# Patient Record
Sex: FEMALE | Race: WHITE | NOT HISPANIC OR LATINO | URBAN - METROPOLITAN AREA
[De-identification: names, ages, dates, MRNs, and addresses within clinical notes are randomized per-mention and may not be internally consistent; named-entity substitution may affect disease eponyms.]

---

## 2021-03-02 ENCOUNTER — OUTPATIENT (OUTPATIENT)
Dept: OUTPATIENT SERVICES | Facility: HOSPITAL | Age: 36
LOS: 1 days | End: 2021-03-02
Payer: COMMERCIAL

## 2021-03-02 VITALS
WEIGHT: 145.06 LBS | OXYGEN SATURATION: 100 % | TEMPERATURE: 99 F | HEIGHT: 63 IN | HEART RATE: 58 BPM | SYSTOLIC BLOOD PRESSURE: 120 MMHG | RESPIRATION RATE: 14 BRPM | DIASTOLIC BLOOD PRESSURE: 81 MMHG

## 2021-03-02 DIAGNOSIS — Z01.818 ENCOUNTER FOR OTHER PREPROCEDURAL EXAMINATION: ICD-10-CM

## 2021-03-02 DIAGNOSIS — K06.1 GINGIVAL ENLARGEMENT: ICD-10-CM

## 2021-03-02 DIAGNOSIS — Z98.890 OTHER SPECIFIED POSTPROCEDURAL STATES: Chronic | ICD-10-CM

## 2021-03-02 DIAGNOSIS — K60.1 CHRONIC ANAL FISSURE: ICD-10-CM

## 2021-03-02 DIAGNOSIS — Z98.891 HISTORY OF UTERINE SCAR FROM PREVIOUS SURGERY: Chronic | ICD-10-CM

## 2021-03-02 LAB
HCT VFR BLD CALC: 39.8 % — SIGNIFICANT CHANGE UP (ref 34.5–45)
HGB BLD-MCNC: 13 G/DL — SIGNIFICANT CHANGE UP (ref 11.5–15.5)
MCHC RBC-ENTMCNC: 29.7 PG — SIGNIFICANT CHANGE UP (ref 27–34)
MCHC RBC-ENTMCNC: 32.7 GM/DL — SIGNIFICANT CHANGE UP (ref 32–36)
MCV RBC AUTO: 90.9 FL — SIGNIFICANT CHANGE UP (ref 80–100)
NRBC # BLD: 0 /100 WBCS — SIGNIFICANT CHANGE UP (ref 0–0)
PLATELET # BLD AUTO: 273 K/UL — SIGNIFICANT CHANGE UP (ref 150–400)
RBC # BLD: 4.38 M/UL — SIGNIFICANT CHANGE UP (ref 3.8–5.2)
RBC # FLD: 13.2 % — SIGNIFICANT CHANGE UP (ref 10.3–14.5)
WBC # BLD: 9.84 K/UL — SIGNIFICANT CHANGE UP (ref 3.8–10.5)
WBC # FLD AUTO: 9.84 K/UL — SIGNIFICANT CHANGE UP (ref 3.8–10.5)

## 2021-03-02 PROCEDURE — 85027 COMPLETE CBC AUTOMATED: CPT

## 2021-03-02 PROCEDURE — G0463: CPT

## 2021-03-02 RX ORDER — LIDOCAINE HCL 20 MG/ML
0.2 VIAL (ML) INJECTION ONCE
Refills: 0 | Status: DISCONTINUED | OUTPATIENT
Start: 2021-03-16 | End: 2021-03-30

## 2021-03-02 RX ORDER — SODIUM CHLORIDE 9 MG/ML
3 INJECTION INTRAMUSCULAR; INTRAVENOUS; SUBCUTANEOUS EVERY 8 HOURS
Refills: 0 | Status: DISCONTINUED | OUTPATIENT
Start: 2021-03-16 | End: 2021-03-30

## 2021-03-02 NOTE — H&P PST ADULT - NSANTHOSAYNRD_GEN_A_CORE
No. VLADISLAV screening performed.  STOP BANG Legend: 0-2 = LOW Risk; 3-4 = INTERMEDIATE Risk; 5-8 = HIGH Risk

## 2021-03-02 NOTE — H&P PST ADULT - ALLERGY TYPES
goes for allergy shots/outdoor environmental allergies/indoor environmental allergies/reactions to medicines

## 2021-03-02 NOTE — H&P PST ADULT - NSANTHGENDERRD_ENT_A_CORE
[DM Type 2] : DM Type 2 [Follow - Up] : a follow-up visit [Hypothyroidism] : hypothyroidism [Weight Management/Obesity] : weight management/obesity No

## 2021-03-02 NOTE — H&P PST ADULT - ATTENDING COMMENTS
No changes with HPI or PE since the note was created. Fissurectomy will not be performed. Will instead proceed with chemical sphincterotomy with botox injection. Ample time was given for questions. Patient verbalizes understanding and agrees to plan.

## 2021-03-02 NOTE — H&P PST ADULT - HISTORY OF PRESENT ILLNESS
Mrs. Lackey is a 35 year old woman with chronic anal fissure scheduled for fissurectomy 3/16/21. Has c/o constipation on Miralax    Denies s/s of COVID no fever, chills, body aches, headaches, loss of taste/smell    COVID testing 3/13/21 at 930am

## 2021-03-02 NOTE — H&P PST ADULT - NSICDXPROBLEM_GEN_ALL_CORE_FT
PROBLEM DIAGNOSES  Problem: Chronic anal fissure  Assessment and Plan: Scheduled for Fissurectomy on 3/16/21.  Preop instructions given  CBC pending.  POCT urine pregnancy upon admission to asu

## 2021-03-08 PROBLEM — Z87.19 PERSONAL HISTORY OF OTHER DISEASES OF THE DIGESTIVE SYSTEM: Chronic | Status: ACTIVE | Noted: 2021-03-02

## 2021-03-13 ENCOUNTER — OUTPATIENT (OUTPATIENT)
Dept: OUTPATIENT SERVICES | Facility: HOSPITAL | Age: 36
LOS: 1 days | End: 2021-03-13
Payer: COMMERCIAL

## 2021-03-13 DIAGNOSIS — Z11.52 ENCOUNTER FOR SCREENING FOR COVID-19: ICD-10-CM

## 2021-03-13 DIAGNOSIS — Z98.891 HISTORY OF UTERINE SCAR FROM PREVIOUS SURGERY: Chronic | ICD-10-CM

## 2021-03-13 DIAGNOSIS — Z98.890 OTHER SPECIFIED POSTPROCEDURAL STATES: Chronic | ICD-10-CM

## 2021-03-13 LAB — SARS-COV-2 RNA SPEC QL NAA+PROBE: SIGNIFICANT CHANGE UP

## 2021-03-13 PROCEDURE — U0003: CPT

## 2021-03-13 PROCEDURE — C9803: CPT

## 2021-03-13 PROCEDURE — U0005: CPT

## 2021-03-15 ENCOUNTER — TRANSCRIPTION ENCOUNTER (OUTPATIENT)
Age: 36
End: 2021-03-15

## 2021-03-16 ENCOUNTER — OUTPATIENT (OUTPATIENT)
Dept: OUTPATIENT SERVICES | Facility: HOSPITAL | Age: 36
LOS: 1 days | End: 2021-03-16
Payer: COMMERCIAL

## 2021-03-16 VITALS
RESPIRATION RATE: 18 BRPM | DIASTOLIC BLOOD PRESSURE: 58 MMHG | TEMPERATURE: 98 F | HEART RATE: 86 BPM | OXYGEN SATURATION: 97 % | SYSTOLIC BLOOD PRESSURE: 109 MMHG

## 2021-03-16 VITALS
TEMPERATURE: 98 F | DIASTOLIC BLOOD PRESSURE: 75 MMHG | RESPIRATION RATE: 16 BRPM | HEIGHT: 63 IN | HEART RATE: 58 BPM | WEIGHT: 145.06 LBS | SYSTOLIC BLOOD PRESSURE: 113 MMHG | OXYGEN SATURATION: 100 %

## 2021-03-16 DIAGNOSIS — Z98.890 OTHER SPECIFIED POSTPROCEDURAL STATES: Chronic | ICD-10-CM

## 2021-03-16 DIAGNOSIS — K60.1 CHRONIC ANAL FISSURE: ICD-10-CM

## 2021-03-16 DIAGNOSIS — K60.3 ANAL FISTULA: ICD-10-CM

## 2021-03-16 DIAGNOSIS — Z98.891 HISTORY OF UTERINE SCAR FROM PREVIOUS SURGERY: Chronic | ICD-10-CM

## 2021-03-16 PROCEDURE — 49505 PRP I/HERN INIT REDUC >5 YR: CPT

## 2021-03-16 RX ORDER — FENTANYL CITRATE 50 UG/ML
25 INJECTION INTRAVENOUS
Refills: 0 | Status: DISCONTINUED | OUTPATIENT
Start: 2021-03-16 | End: 2021-03-16

## 2021-03-16 RX ORDER — SODIUM CHLORIDE 9 MG/ML
1000 INJECTION, SOLUTION INTRAVENOUS
Refills: 0 | Status: DISCONTINUED | OUTPATIENT
Start: 2021-03-16 | End: 2021-03-30

## 2021-03-16 RX ORDER — ONDANSETRON 8 MG/1
4 TABLET, FILM COATED ORAL ONCE
Refills: 0 | Status: DISCONTINUED | OUTPATIENT
Start: 2021-03-16 | End: 2021-03-30

## 2021-03-16 RX ORDER — MORPHINE SULFATE 50 MG/1
4 CAPSULE, EXTENDED RELEASE ORAL
Refills: 0 | Status: DISCONTINUED | OUTPATIENT
Start: 2021-03-16 | End: 2021-03-16

## 2021-03-16 NOTE — ASU DISCHARGE PLAN (ADULT/PEDIATRIC) - RETURN TO WORK/SCHOOL
What Type Of Note Output Would You Prefer (Optional)?: Bullet Format How Severe Is Your Rash?: severe wait 24 hours Is This A New Presentation, Or A Follow-Up?: Rash

## 2021-03-16 NOTE — ASU DISCHARGE PLAN (ADULT/PEDIATRIC) - CARE PROVIDER_API CALL
Isiah Bello (DO)  ColonRectal Surgery  [address] [place] [address]  Phone: [phone]  Fax: [phone]  Established Patient  Follow Up Time: 2 weeks

## 2021-03-16 NOTE — ASU DISCHARGE PLAN (ADULT/PEDIATRIC) - NURSING INSTRUCTIONS
Tylenol/acetaminophen------AND/OR------Motrin/ibuprofen  as needed for pain/discomfort.  NEXT DOSE:  Tylenol  OK @  4:00pm this afternoon, if needed.  Follow up with MD as requested; call office for appointment.

## 2021-03-16 NOTE — ASU DISCHARGE PLAN (ADULT/PEDIATRIC) - ASU DC SPECIAL INSTRUCTIONSFT
Continue local care after BMs as previously instructed.    Regular diet.    Nothing per rectum.    No post-procedure pain medications required.    Follow up in the office in 2 weeks.

## 2021-05-28 ENCOUNTER — OUTPATIENT (OUTPATIENT)
Dept: OUTPATIENT SERVICES | Facility: HOSPITAL | Age: 36
LOS: 1 days | End: 2021-05-28

## 2021-05-28 VITALS
OXYGEN SATURATION: 99 % | RESPIRATION RATE: 16 BRPM | WEIGHT: 143.08 LBS | HEIGHT: 63 IN | HEART RATE: 68 BPM | DIASTOLIC BLOOD PRESSURE: 70 MMHG | SYSTOLIC BLOOD PRESSURE: 100 MMHG | TEMPERATURE: 98 F

## 2021-05-28 DIAGNOSIS — Z98.890 OTHER SPECIFIED POSTPROCEDURAL STATES: Chronic | ICD-10-CM

## 2021-05-28 DIAGNOSIS — K60.2 ANAL FISSURE, UNSPECIFIED: Chronic | ICD-10-CM

## 2021-05-28 DIAGNOSIS — K60.1 CHRONIC ANAL FISSURE: ICD-10-CM

## 2021-05-28 DIAGNOSIS — Z87.19 PERSONAL HISTORY OF OTHER DISEASES OF THE DIGESTIVE SYSTEM: ICD-10-CM

## 2021-05-28 DIAGNOSIS — T78.40XA ALLERGY, UNSPECIFIED, INITIAL ENCOUNTER: ICD-10-CM

## 2021-05-28 DIAGNOSIS — Z98.891 HISTORY OF UTERINE SCAR FROM PREVIOUS SURGERY: Chronic | ICD-10-CM

## 2021-05-28 LAB
ANION GAP SERPL CALC-SCNC: 12 MMOL/L — SIGNIFICANT CHANGE UP (ref 7–14)
BUN SERPL-MCNC: 9 MG/DL — SIGNIFICANT CHANGE UP (ref 7–23)
CALCIUM SERPL-MCNC: 9.5 MG/DL — SIGNIFICANT CHANGE UP (ref 8.4–10.5)
CHLORIDE SERPL-SCNC: 103 MMOL/L — SIGNIFICANT CHANGE UP (ref 98–107)
CO2 SERPL-SCNC: 24 MMOL/L — SIGNIFICANT CHANGE UP (ref 22–31)
CREAT SERPL-MCNC: 0.64 MG/DL — SIGNIFICANT CHANGE UP (ref 0.5–1.3)
GLUCOSE SERPL-MCNC: 75 MG/DL — SIGNIFICANT CHANGE UP (ref 70–99)
HCG UR QL: NEGATIVE — SIGNIFICANT CHANGE UP
HCT VFR BLD CALC: 39.4 % — SIGNIFICANT CHANGE UP (ref 34.5–45)
HGB BLD-MCNC: 13 G/DL — SIGNIFICANT CHANGE UP (ref 11.5–15.5)
MCHC RBC-ENTMCNC: 29.2 PG — SIGNIFICANT CHANGE UP (ref 27–34)
MCHC RBC-ENTMCNC: 33 GM/DL — SIGNIFICANT CHANGE UP (ref 32–36)
MCV RBC AUTO: 88.5 FL — SIGNIFICANT CHANGE UP (ref 80–100)
NRBC # BLD: 0 /100 WBCS — SIGNIFICANT CHANGE UP
NRBC # FLD: 0 K/UL — SIGNIFICANT CHANGE UP
PLATELET # BLD AUTO: 259 K/UL — SIGNIFICANT CHANGE UP (ref 150–400)
POTASSIUM SERPL-MCNC: 3.9 MMOL/L — SIGNIFICANT CHANGE UP (ref 3.5–5.3)
POTASSIUM SERPL-SCNC: 3.9 MMOL/L — SIGNIFICANT CHANGE UP (ref 3.5–5.3)
RBC # BLD: 4.45 M/UL — SIGNIFICANT CHANGE UP (ref 3.8–5.2)
RBC # FLD: 12.1 % — SIGNIFICANT CHANGE UP (ref 10.3–14.5)
SODIUM SERPL-SCNC: 139 MMOL/L — SIGNIFICANT CHANGE UP (ref 135–145)
WBC # BLD: 8.48 K/UL — SIGNIFICANT CHANGE UP (ref 3.8–10.5)
WBC # FLD AUTO: 8.48 K/UL — SIGNIFICANT CHANGE UP (ref 3.8–10.5)

## 2021-05-28 RX ORDER — POLYETHYLENE GLYCOL 3350 17 G/17G
0 POWDER, FOR SOLUTION ORAL
Qty: 0 | Refills: 0 | DISCHARGE

## 2021-05-28 RX ORDER — SODIUM CHLORIDE 9 MG/ML
1000 INJECTION, SOLUTION INTRAVENOUS
Refills: 0 | Status: DISCONTINUED | OUTPATIENT
Start: 2021-06-08 | End: 2021-06-22

## 2021-05-28 RX ORDER — MAGNESIUM GLYCINATE 100 MG
2 CAPSULE ORAL
Qty: 0 | Refills: 0 | DISCHARGE

## 2021-05-28 RX ORDER — CHOLECALCIFEROL (VITAMIN D3) 125 MCG
1 CAPSULE ORAL
Qty: 0 | Refills: 0 | DISCHARGE

## 2021-05-28 NOTE — H&P PST ADULT - NSICDXPROBLEM_GEN_ALL_CORE_FT
PROBLEM DIAGNOSES  Problem: History of anal fissures  Assessment and Plan:        PROBLEM DIAGNOSES  Problem: History of anal fissures  Assessment and Plan: Procedure as booked ; Fissurectomy ; pt states also "Botox " call to surgeons office ; s/w Nedra ; as per Nedra procedure will include Botox ; Denies to notify OR Booking  Pre op instructions  reviewed with pt ; pt verbalized good understanding of pre op instructions  Pre op Covid test scheduled per pt  Cup provided for UCG dos     Problem: Allergy  Assessment and Plan: Cipro  OR Booking notified via fax       PROBLEM DIAGNOSES  Problem: History of anal fissures  Assessment and Plan: Procedure as booked ; Fissurectomy ; pt states also "Botox " call to surgeons office ; s/w Nedra ; as per Nedra procedure will include Botox ; Denies to clarify with OR Booking  Pre op instructions  reviewed with pt ; pt verbalized good understanding of pre op instructions  Pre op Covid test scheduled per pt  Cup provided for UCG dos     Problem: Allergy  Assessment and Plan: Cipro  OR Booking notified via fax

## 2021-05-28 NOTE — H&P PST ADULT - NSICDXPASTMEDICALHX_GEN_ALL_CORE_FT
Pharmacy Dosing Services: Warfarin Consult for Warfarin Dosing by Pharmacy by Dr. Heidi Oakes Consult provided for this 81 y.o.  female , for indication of Atrial Fibrillation. Day of Therapy 02 Dose to achieve an INR goal of 2-3 Order entered for  Warfarin  3 mg to be given today at 18:00. Significant drug interactions: Amiodarone, ASA 
 
LABS   
PT/INR Lab Results Component Value Date/Time INR 1.6 (H) 11/20/2019 04:00 AM  
  
Platelets Lab Results Component Value Date/Time PLATELET 057 (L) 61/75/8202 04:00 AM  
  
H/H Lab Results Component Value Date/Time HGB 10.2 (L) 11/20/2019 04:00 AM  
  
 
Warfarin Administrations (last 168 hours) Date/Time Action Medication Dose 11/19/19 1755 Given  
 warfarin (COUMADIN) tablet 3 mg 3 mg  
 11/14/19 1845 Given  
 warfarin (COUMADIN) tablet 4 mg 4 mg Pharmacy to follow daily and will provide subsequent Warfarin dosing based on clinical status. Sheila Winn, Pharm. D. Clinical Pharmacist 
[phone] PAST MEDICAL HISTORY:  H/O constipation     History of iron deficiency anemia 7/2021

## 2021-05-28 NOTE — H&P PST ADULT - HISTORY OF PRESENT ILLNESS
Mrs. Lackey is a 35 year old woman with chronic anal fissure x 3 years s/p  fissurectomy 3/16/21. Pt c/o farooq rectal pain . Pt now presents for Fissurectomy ; see plan of care      Mrs. Lackey is a 35 year old woman with chronic anal fissure x 3 years s/p  fissurectomy 3/16/21. Pt c/o farooq rectal pain . Pt now presents for Fissurectomy ; see plan of care regarding procedure

## 2021-05-28 NOTE — H&P PST ADULT - NSICDXPASTSURGICALHX_GEN_ALL_CORE_FT
PAST SURGICAL HISTORY:  Anal fissure Tx surgically 3/2021    H/O  section ,     History of augmentation of both breasts     S/P colonoscopy 10/2020     PAST SURGICAL HISTORY:  Anal fissure S/P ;  Chemical Sphincterotomy with Botox Injection  3/2021    H/O  section ,     History of augmentation of both breasts     S/P colonoscopy 10/2020

## 2021-05-28 NOTE — H&P PST ADULT - ALLERGY TYPES
goes for allergy shots; last May 14/outdoor environmental allergies/indoor environmental allergies/reactions to medicines

## 2021-06-05 ENCOUNTER — LABORATORY RESULT (OUTPATIENT)
Age: 36
End: 2021-06-05

## 2021-06-05 ENCOUNTER — APPOINTMENT (OUTPATIENT)
Dept: DISASTER EMERGENCY | Facility: CLINIC | Age: 36
End: 2021-06-05

## 2021-06-05 DIAGNOSIS — Z01.818 ENCOUNTER FOR OTHER PREPROCEDURAL EXAMINATION: ICD-10-CM

## 2021-06-07 ENCOUNTER — TRANSCRIPTION ENCOUNTER (OUTPATIENT)
Age: 36
End: 2021-06-07

## 2021-06-07 NOTE — ASU PATIENT PROFILE, ADULT - PSH
Anal fissure  S/P ;  Chemical Sphincterotomy with Botox Injection  3/2021  H/O  section  ,   History of augmentation of both breasts    S/P colonoscopy  10/2020

## 2021-06-07 NOTE — ASU PATIENT PROFILE, ADULT - PATIENT KNOW
Patient informed of lab results, TSH WNL, Creatinine has improved, now WNL.  Patient has no further questions or concerns at this time.    yes

## 2021-06-08 ENCOUNTER — RESULT REVIEW (OUTPATIENT)
Age: 36
End: 2021-06-08

## 2021-06-08 ENCOUNTER — TRANSCRIPTION ENCOUNTER (OUTPATIENT)
Age: 36
End: 2021-06-08

## 2021-06-08 ENCOUNTER — OUTPATIENT (OUTPATIENT)
Dept: OUTPATIENT SERVICES | Facility: HOSPITAL | Age: 36
LOS: 1 days | Discharge: HOME | End: 2021-06-08
Payer: COMMERCIAL

## 2021-06-08 VITALS
HEIGHT: 63 IN | RESPIRATION RATE: 16 BRPM | OXYGEN SATURATION: 99 % | TEMPERATURE: 99 F | SYSTOLIC BLOOD PRESSURE: 102 MMHG | WEIGHT: 143.08 LBS | HEART RATE: 64 BPM | DIASTOLIC BLOOD PRESSURE: 70 MMHG

## 2021-06-08 VITALS
TEMPERATURE: 98 F | OXYGEN SATURATION: 100 % | HEART RATE: 54 BPM | DIASTOLIC BLOOD PRESSURE: 945 MMHG | SYSTOLIC BLOOD PRESSURE: 94 MMHG | RESPIRATION RATE: 17 BRPM

## 2021-06-08 DIAGNOSIS — Z98.891 HISTORY OF UTERINE SCAR FROM PREVIOUS SURGERY: Chronic | ICD-10-CM

## 2021-06-08 DIAGNOSIS — K60.1 CHRONIC ANAL FISSURE: ICD-10-CM

## 2021-06-08 DIAGNOSIS — Z98.890 OTHER SPECIFIED POSTPROCEDURAL STATES: Chronic | ICD-10-CM

## 2021-06-08 DIAGNOSIS — K60.2 ANAL FISSURE, UNSPECIFIED: Chronic | ICD-10-CM

## 2021-06-08 PROCEDURE — 88304 TISSUE EXAM BY PATHOLOGIST: CPT | Mod: 26

## 2021-06-08 RX ORDER — ZINC SULFATE TAB 220 MG (50 MG ZINC EQUIVALENT) 220 (50 ZN) MG
1 TAB ORAL
Qty: 0 | Refills: 0 | DISCHARGE

## 2021-06-08 RX ORDER — ONABOTULINUMTOXINA 100 UNIT
80 VIAL (EA) INJECTION ONCE
Refills: 0 | Status: DISCONTINUED | OUTPATIENT
Start: 2021-06-08 | End: 2021-06-22

## 2021-06-08 RX ORDER — FEXOFENADINE HCL AND PSEUDOEPHEDRINE HCI 60; 120 MG/1; MG/1
1 TABLET, EXTENDED RELEASE ORAL
Qty: 0 | Refills: 0 | DISCHARGE

## 2021-06-08 RX ORDER — IBUPROFEN 200 MG
1 TABLET ORAL
Qty: 42 | Refills: 0
Start: 2021-06-08

## 2021-06-08 RX ORDER — PYRIDOXINE HCL (VITAMIN B6) 100 MG
1 TABLET ORAL
Qty: 0 | Refills: 0 | DISCHARGE

## 2021-06-08 NOTE — ASU DISCHARGE PLAN (ADULT/PEDIATRIC) - PROCEDURE
anal fissurectomy using botox injections, anal sphincteroplasty anal fissurectomy, chemical sphincterotomy, anoplasty

## 2021-06-08 NOTE — BRIEF OPERATIVE NOTE - OPERATION/FINDINGS
Anal fissurectomy using botox injections, anal sphincteroplasty Anal fissurectomy, chemical sphincterotomy with botox injection, anoplasty

## 2021-06-08 NOTE — ASU DISCHARGE PLAN (ADULT/PEDIATRIC) - CARE PROVIDER_API CALL
Isiah Bello (DO)  ColonRectal Surgery  [address] [place] [address]  Phone: [phone]  Fax: [phone]  Follow Up Time: 1 week

## 2021-06-08 NOTE — ASU DISCHARGE PLAN (ADULT/PEDIATRIC) - ASU DC SPECIAL INSTRUCTIONSFT
POST HEMORRHOIDECTOMY INSTRUCTIONS  1. Call my office today to schedule a follow up visit in 7 days.  2. Remove the large dressing tonight if you need to have a bowel movement and replace it with 4”x4" gauze pads as needed.  3. The rectal packing will come out with your first bowel movement.  4. You may shower or start 15-20 minute tub baths tomorrow morning.  a. Do NOT rub the operative area.  5. Take Metamucil every day. If Metamucil makes you bloated/gassy, you may take Citrucel or Benefiber.  6. Take Colace 100mg tabs – one tab three times a day.  7. Do sitz baths three times a day.  8. Avoid taking narcotics unless the pain is severe and intolerable.  9. The first few days after a hemorrhoidectomy are the worst in terms of pain. Control your pain before it becomes severe. You can take over the counter Tylenol and Ibuprofen.  a. Take 800mg of Ibuprofen. You would be taking pain medication every 4hrs to control your pain in the first 3-4 days after your hemorrhoidectomy.  11. Call the office with any questions you may have.  12. Eat lightly for 3-4 days (liquids/soft foods: soups, eggs, mashed potatoes, fish, jell-o)  13. Drink eight glasses of water a day. Activity as tolerates.    Nothing per rectum.    Regular diet.    May remove dressing today to have BM or start local care.    Perform bath soaks or shower irrigations with pain and after BMs.    Apply draw clean gauze to the area to absorb discharge from the wound.    Ibuprofen 800 mg prescription sent to your pharmacy.    Follow up in office in 1 week.

## 2021-06-12 PROBLEM — Z01.818 PREOP TESTING: Status: ACTIVE | Noted: 2021-06-12

## 2022-07-13 NOTE — H&P PST ADULT - RESPIRATORY
Addended by: Emir Becerra on: 7/13/2022 10:55 AM     Modules accepted: Level of Service detailed exam

## 2023-04-11 NOTE — H&P PST ADULT - NSCAFFEINETYPE_GEN_ALL_CORE_SD
coffee Sski Pregnancy And Lactation Text: This medication is Pregnancy Category D and isn't considered safe during pregnancy. It is excreted in breast milk.

## 2024-01-01 NOTE — H&P PST ADULT - RS GEN PE MLT RESP DETAILS PC
airway patent/breath sounds equal/good air movement/respirations non-labored/clear to auscultation bilaterally Statement Selected

## 2024-04-26 NOTE — ASU PREOP CHECKLIST - HEIGHT IN INCHES
Lipitor prescription sent to [facility] in [facility].    All discharge instructions reviewed, questions answered. Pulses obtained & unchanged. Pt ambulatory. Site clean dry and intact. No bleeding, hematoma, or bruising noted. Patient discharged with all belongings. Patient discharged via wheelchair by writer. IV removed.   3